# Patient Record
Sex: FEMALE | Race: OTHER | Employment: FULL TIME | URBAN - METROPOLITAN AREA
[De-identification: names, ages, dates, MRNs, and addresses within clinical notes are randomized per-mention and may not be internally consistent; named-entity substitution may affect disease eponyms.]

---

## 2020-04-22 ENCOUNTER — HOSPITAL ENCOUNTER (OUTPATIENT)
Dept: PHYSICAL THERAPY | Age: 35
Setting detail: THERAPIES SERIES
Discharge: HOME | End: 2020-04-22
Payer: COMMERCIAL

## 2020-04-22 PROCEDURE — 97110 THERAPEUTIC EXERCISES: CPT | Performed by: PHYSICAL THERAPIST

## 2020-04-22 PROCEDURE — 97124 MASSAGE THERAPY: CPT | Performed by: PHYSICAL THERAPIST

## 2020-04-22 PROCEDURE — 97161 PT EVAL LOW COMPLEX 20 MIN: CPT | Performed by: PHYSICAL THERAPIST

## 2020-04-23 ASSESSMENT — PAIN DESCRIPTION - ONSET: ONSET: ON-GOING

## 2020-04-23 ASSESSMENT — PAIN SCALES - GENERAL: PAINLEVEL_OUTOF10: 3

## 2020-04-23 ASSESSMENT — PAIN DESCRIPTION - ORIENTATION: ORIENTATION: LOWER

## 2020-04-23 ASSESSMENT — PAIN DESCRIPTION - FREQUENCY: FREQUENCY: CONTINUOUS

## 2020-04-23 ASSESSMENT — PAIN DESCRIPTION - PROGRESSION: CLINICAL_PROGRESSION: NOT CHANGED

## 2020-04-23 ASSESSMENT — PAIN - FUNCTIONAL ASSESSMENT: PAIN_FUNCTIONAL_ASSESSMENT: PREVENTS OR INTERFERES SOME ACTIVE ACTIVITIES AND ADLS

## 2020-04-23 ASSESSMENT — PAIN DESCRIPTION - PAIN TYPE: TYPE: ACUTE PAIN

## 2020-04-23 ASSESSMENT — PAIN DESCRIPTION - DESCRIPTORS: DESCRIPTORS: ACHING;SHARP

## 2020-04-23 ASSESSMENT — PAIN DESCRIPTION - LOCATION: LOCATION: BACK

## 2020-04-23 NOTE — PROGRESS NOTES
[facility]  PHYSICAL THERAPY EVALUATION    Date: 2020  Patient Name: Berny Walden       MRN: [id number]   Account: [de-identified]   : 1985  (38 y.o.)   Gender: female   Referring Practitioner: Martha Mahan M.D. Diagnosis: Sprain of ligaments of the lumbar spine  Treatment Diagnosis: Sprain of the lumbar area           Other position/activity restrictions: Patient is currently on light duty with lifting restrictions 5# or less, push/pull 20# or less    Past Medical History:  has no past medical history on file. Past Surgical History:   has no past surgical history on file. Vital Signs  Patient Currently in Pain: Yes   Pain Screening  Patient Currently in Pain: Yes  Pain Assessment  Pain Assessment: 0-10  Pain Level: 3  Patient's Stated Pain Goal: No pain  Pain Type: Acute pain  Pain Location: Back  Pain Orientation: Lower  Pain Descriptors: Aching; Sharp  Pain Frequency: Continuous  Pain Onset: On-going  Clinical Progression: Not changed  Functional Pain Assessment: Prevents or interferes some active activities and ADLs  Non-Pharmaceutical Pain Intervention(s): (Lidocane patch)  Multiple Pain Sites: No           Active : Yes  Mode of Transportation: Car  Occupation: Full time employment  Type of occupation: Head lead at [employer]        Subjective:  Subjective: Patient states that while at work, she was picking up buckets of product and lifting it overhead. She then started getting pain of her lower back and felt pinching. The next day was the weekend and she could not get out of bed because of her lower back pain. On Monday she returned to work but had to leave early because of her lower back pain. She went the ER in [place] and was given medication and sent home for two days. She was unable to work, then was seen in [address] [place] [address]. She was given Meloxicam and ordered physical therapy.  She reports she is on light duty mostly sitting doing paper work. She is having difficulty sleeping. Objective:      Strength RLE  Strength RLE: WNL  Strength LLE  Strength LLE: WNL         AROM RLE (degrees)  RLE AROM: WNL     AROM LLE (degrees)  LLE AROM : WNL        AROM RUE (degrees)  RUE AROM : WNL  AROM LUE (degrees)  LUE AROM : WNL    Spine  Lumbar: Forward flexion 0-30; extension 0-20; right rotation 0-20; left rotation 0-35, right and left sidebend WNL    Observation/Palpation  Posture: Good  Palpation: Moderate soft tissue tightness bilateral lumbar paraspinals   Observation: Increased lumbar lordosis  Body Mechanics: Fair        Additional Measures  Special Tests: Positive SI compression test, DTR patella and achilles normal bilaterally; negative SLR; negative Jillian         Exercises:   Exercises  Exercise 1: Supine knee rolls  Exercise 2: Standing trunk rotation  Exercise 3: Standing trunk extension  Exercise 4: Standing trunk sidebending  Exercise 5: Sitting forward flexion  Modalities: Massage to mid lumbar paraspinals     Manual:     *Indicates exercise,modality, or manual techniques to be initiated when appropriate  Assessment: Body structures, Functions, Activity limitations: Decreased functional mobility , Decreased ROM, Decreased strength, Increased pain(Unable to tolerate regular work duty)  Assessment: Problem List:  1. Decreased trunk range of motion 2. Soft tissue tightness bilateral lumbar paraspinals, 3. Fair body mechanics in relation to work duty 4. Unable to tolerate regular work duty 5. Pain of the lumbar area  Specific instructions for Next Treatment: Do not wear lidocane patch prior to therapy  Prognosis: Excellent  Discharge Recommendations: 2-3 sessions per week  Activity Tolerance: Patient limited by pain  Activity Tolerance: Patient had difficulty with supine knee to chest due to pain.   She was wearing a Lidocane patch and it was recommended she not use one prior to therapy sessions

## 2020-04-28 ENCOUNTER — HOSPITAL ENCOUNTER (OUTPATIENT)
Dept: PHYSICAL THERAPY | Age: 35
Setting detail: THERAPIES SERIES
Discharge: HOME | End: 2020-04-28
Payer: COMMERCIAL

## 2020-04-28 PROCEDURE — G0283 ELEC STIM OTHER THAN WOUND: HCPCS | Performed by: PHYSICAL THERAPIST

## 2020-04-28 PROCEDURE — 97110 THERAPEUTIC EXERCISES: CPT | Performed by: PHYSICAL THERAPIST

## 2020-04-28 ASSESSMENT — PAIN DESCRIPTION - LOCATION: LOCATION: BACK

## 2020-04-28 ASSESSMENT — PAIN DESCRIPTION - PROGRESSION: CLINICAL_PROGRESSION: GRADUALLY IMPROVING

## 2020-04-28 ASSESSMENT — PAIN SCALES - GENERAL: PAINLEVEL_OUTOF10: 1

## 2020-04-28 NOTE — PROGRESS NOTES
residing in the right SI area. Prognosis: Excellent  PT Education: Home Exercise Program, Plan of Care  Patient Education: HEP instruction sheet with pictures given to patient    Goals:          Progress toward goals:    POST-PAIN       Pain Rating (0-10 pain scale):  1 /10   Location and pain description same as pre-treatment unless indicated. 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 See objective section for any therapeutic exercise changes, additions or modifications this date.          PT Individual Minutes  Time In: 1100  Time Out: 5364  Minutes: 45  Timed Code Treatment Minutes: 45 Minutes  Procedure Minutes:     Timed Activity Minutes Units   Ther Ex 30 2   Manual  IES 15 1       Signature:  Electronically signed by Amanda Cordon PT on 4/28/20 at 11:55 AM EDT

## 2020-04-30 ENCOUNTER — HOSPITAL ENCOUNTER (OUTPATIENT)
Dept: PHYSICAL THERAPY | Age: 35
Setting detail: THERAPIES SERIES
Discharge: HOME | End: 2020-04-30
Payer: COMMERCIAL

## 2020-04-30 PROCEDURE — G0283 ELEC STIM OTHER THAN WOUND: HCPCS

## 2020-04-30 PROCEDURE — 97110 THERAPEUTIC EXERCISES: CPT

## 2020-04-30 PROCEDURE — 97124 MASSAGE THERAPY: CPT

## 2020-04-30 ASSESSMENT — PAIN DESCRIPTION - ONSET: ONSET: ON-GOING

## 2020-04-30 ASSESSMENT — PAIN DESCRIPTION - LOCATION: LOCATION: BACK

## 2020-04-30 ASSESSMENT — PAIN DESCRIPTION - FREQUENCY: FREQUENCY: CONTINUOUS

## 2020-04-30 ASSESSMENT — PAIN DESCRIPTION - DESCRIPTORS: DESCRIPTORS: SHOOTING;TIGHTNESS

## 2020-04-30 ASSESSMENT — PAIN SCALES - GENERAL: PAINLEVEL_OUTOF10: 2

## 2020-04-30 ASSESSMENT — PAIN DESCRIPTION - PAIN TYPE: TYPE: ACUTE PAIN

## 2020-04-30 NOTE — PROGRESS NOTES
posterior pelvic tilts x10  Strength: [x] NT  [] MMT completed:   ROM: [x] NT  [] ROM measurements:   Manual:   Manual therapy  Soft Tissue Mobalization: Myofascial release progressed to deep tissue massage lower thoracic to low lumbar/SI region bilateral  Modalities:  Modalities  Moist heat: to low lumbar/SI region start of session  Cryotherapy (Minutes\Location): with IFC after exercises  E-stim (parameters): IFC to low lumbar with ice end of session     Assessment:   Activity Tolerance  Activity Tolerance: Patient limited by pain  Activity Tolerance: Pt tolerated exercises well within pain-free range, but range is very limited  Body structures, Functions, Activity limitations: Decreased ROM, Decreased strength, Increased pain(Unable to tolerate regular work duty)  Assessment: Most exercises were graded to stay within pain-free range to prevent muscle irritation. Pt unable to reach end range with any attempted exercise this session due to pain; need to progress flexibility/ functional movement in greater range. Pt exhibits significant tightness in low lumbar, sacral area. Weakness of quadratus lumborum noted as demonstrated by poor hip hiking ability. Treatment Diagnosis: Sprain of the lumbar area  Prognosis: Excellent  PT Education: Home Exercise Program, Plan of Care  Patient Education: Pt instructed to perform exercises in pain-free range only to prevent muscle irritation. Goals:  Short term goals  Short term goal 1: Increase trunk range of motion by 10-20 all affected motions  Short term goal 2: Pain of lumbar area decreased to 1/10  Short term goal 3: Minimal soft tissue tightness of the lumbar paraspinals  Progress toward goals: Fair    POST-PAIN       Pain Rating (0-10 pain scale):   1/10   Location and pain description same as pre-treatment unless indicated.    Action: [] NA   [x] Perform HEP  [x] Meds as prescribed  [x] Modalities as prescribed   [] Call Physician   Frequency/Duration:  Plan  Times

## 2020-05-05 ENCOUNTER — HOSPITAL ENCOUNTER (OUTPATIENT)
Dept: PHYSICAL THERAPY | Age: 35
Setting detail: THERAPIES SERIES
Discharge: HOME | End: 2020-05-05
Payer: COMMERCIAL

## 2020-05-05 PROCEDURE — 97110 THERAPEUTIC EXERCISES: CPT | Performed by: PHYSICAL THERAPIST

## 2020-05-05 PROCEDURE — G0283 ELEC STIM OTHER THAN WOUND: HCPCS | Performed by: PHYSICAL THERAPIST

## 2020-05-05 PROCEDURE — 97124 MASSAGE THERAPY: CPT | Performed by: PHYSICAL THERAPIST

## 2020-05-05 ASSESSMENT — PAIN DESCRIPTION - FREQUENCY: FREQUENCY: CONTINUOUS

## 2020-05-05 ASSESSMENT — PAIN DESCRIPTION - PROGRESSION: CLINICAL_PROGRESSION: GRADUALLY WORSENING

## 2020-05-05 ASSESSMENT — PAIN SCALES - GENERAL: PAINLEVEL_OUTOF10: 5

## 2020-05-05 ASSESSMENT — PAIN DESCRIPTION - LOCATION: LOCATION: BACK

## 2020-05-05 ASSESSMENT — PAIN DESCRIPTION - PAIN TYPE: TYPE: ACUTE PAIN

## 2020-05-05 ASSESSMENT — PAIN DESCRIPTION - ONSET: ONSET: ON-GOING

## 2020-05-05 ASSESSMENT — PAIN - FUNCTIONAL ASSESSMENT: PAIN_FUNCTIONAL_ASSESSMENT: PREVENTS OR INTERFERES SOME ACTIVE ACTIVITIES AND ADLS

## 2020-05-05 ASSESSMENT — PAIN DESCRIPTION - ORIENTATION: ORIENTATION: RIGHT

## 2020-05-05 ASSESSMENT — PAIN DESCRIPTION - DESCRIPTORS: DESCRIPTORS: ACHING

## 2020-05-05 NOTE — PROGRESS NOTES
area    Modalities:  Modalities  Moist heat: Prior to massage  Cryotherapy (Minutes\Location): with IFC to the low back after exercises  E-stim (parameters): IFC to low back in jeffrey cross pattern with ice to decrease pain     *Indicates exercise, modality, or manual techniques to be initiated when appropriate    Assessment:   Activity Tolerance  Activity Tolerance: Patient limited by pain  Activity Tolerance: Had difficulty due to pain. Consulted with P.A. about changing meds to Motrin and Tylenol, instead of using Naprosyn    Body structures, Functions, Activity limitations: Decreased ROM, Decreased strength, Increased pain  Assessment: Had difficulty with exercise due to pain complaints     Prognosis: Good  PT Education: Plan of Care    Goals:          Progress toward goals:    POST-PAIN       Pain Rating (0-10 pain scale): 5 /10   Location and pain description same as pre-treatment unless indicated. Action: [] NA   [] Perform HEP  [x] Meds as prescribed  [x] Modalities as prescribed   [] Call Physician     Frequency/Duration:  Plan  Current Treatment Recommendations: Strengthening, Modalities, Pain Management, ROM     Pt to continue current HEP. See objective section for any therapeutic exercise changes, additions or modifications this date.          PT Individual Minutes  Time In: 1000  Time Out: 1100  Minutes: 60  Timed Code Treatment Minutes: 50 Minutes  Procedure Minutes:     Timed Activity Minutes Units   Ther Ex 20 1   Manual  Mass 15   IFC 15 1  1       Signature:  Electronically signed by Rylan Rivera PT on 5/5/20 at 10:57 AM EDT

## 2020-05-14 ENCOUNTER — HOSPITAL ENCOUNTER (OUTPATIENT)
Dept: MRI IMAGING | Age: 35
Discharge: HOME | End: 2020-05-16
Payer: COMMERCIAL

## 2020-05-14 ENCOUNTER — HOSPITAL ENCOUNTER (OUTPATIENT)
Dept: PHYSICAL THERAPY | Age: 35
Setting detail: THERAPIES SERIES
Discharge: HOME | End: 2020-05-14
Payer: COMMERCIAL

## 2020-05-14 PROCEDURE — 97110 THERAPEUTIC EXERCISES: CPT | Performed by: PHYSICAL THERAPIST

## 2020-05-14 PROCEDURE — 72148 MRI LUMBAR SPINE W/O DYE: CPT

## 2020-05-14 PROCEDURE — 97035 APP MDLTY 1+ULTRASOUND EA 15: CPT | Performed by: PHYSICAL THERAPIST

## 2020-05-14 ASSESSMENT — PAIN DESCRIPTION - FREQUENCY: FREQUENCY: CONTINUOUS

## 2020-05-14 ASSESSMENT — PAIN DESCRIPTION - LOCATION: LOCATION: BACK

## 2020-05-14 ASSESSMENT — PAIN SCALES - GENERAL: PAINLEVEL_OUTOF10: 3

## 2020-05-14 ASSESSMENT — PAIN DESCRIPTION - ORIENTATION: ORIENTATION: RIGHT

## 2020-05-14 ASSESSMENT — PAIN DESCRIPTION - ONSET: ONSET: ON-GOING

## 2020-05-14 ASSESSMENT — PAIN DESCRIPTION - PROGRESSION: CLINICAL_PROGRESSION: NOT CHANGED

## 2020-05-14 ASSESSMENT — PAIN - FUNCTIONAL ASSESSMENT: PAIN_FUNCTIONAL_ASSESSMENT: PREVENTS OR INTERFERES SOME ACTIVE ACTIVITIES AND ADLS

## 2020-05-14 ASSESSMENT — PAIN DESCRIPTION - DESCRIPTORS: DESCRIPTORS: ACHING;JABBING

## 2020-05-14 NOTE — PROGRESS NOTES
[id number]   Treatment Note                                          Date: 2020  Patient: Wesley Belcher  : 1985  ACCT #: [de-identified]          Visit Information:  PT Visit Information  Progress Note Counter:     Subjective: Patient reports that she was approved for an MRII and is having it done today.      HEP Compliance:  [x] Good [] Fair [] Poor [] Reports not doing due to:    Vital Signs  Patient Currently in Pain: Yes   Pain Screening  Patient Currently in Pain: Yes  Pain Assessment  Pain Assessment: 0-10  Pain Level: 3  Patient's Stated Pain Goal: No pain  Pain Type: (Subacute)  Pain Location: Back  Pain Orientation: Right  Pain Descriptors: Aching;Jabbing  Pain Frequency: Continuous  Pain Onset: On-going  Clinical Progression: Not changed  Functional Pain Assessment: Prevents or interferes some active activities and ADLs  Multiple Pain Sites: No    OBJECTIVE:   Exercises  Exercise 1: Supine knee rolls 10 reps (pillow under low back/glutes)  Exercise 2: Supine single knee to chest holds (painful this date) on both sides  Exercise 3: Prone knee flexion each leg graded to keep pain reduced x 5  Exercise 4: Prone gluteal sets 10 reps  Exercise 5: Sitting forward flexion holds with forearms on thighs x 15  Exercise 6: Standing trunk active range of motion all planes  Exercise 7: Seated hamstring stretch with opposite leg off plinth  Exercise 8: Rocking quadruped graded stretch of low back (Sit-back toward child's pose)  Exercise 9: Quadruped graded back arches (\"angry cat\") abdominal contraction coming down   Exercise 10: hooklying graded posterior pelvic tilts x10          Strength: [x] NT  [] MMT completed:      ROM: [x] NT  [] ROM measurements:          Manual:        Modalities:  Modalities  Ultrasound: 1.0 MHTZ at 1.5 w/cm2 to the right SI area with 1.0 cc of dexmethasone for 6 minutes     *Indicates exercise, modality, or manual techniques to be initiated when appropriate    Assessment:   Activity Tolerance  Activity Tolerance: Patient Tolerated treatment well, Patient limited by pain    Body structures, Functions, Activity limitations: Decreased ROM, Decreased strength, Increased pain  Assessment: MRI will assist is detrmining next course of treatment     Prognosis: Excellent  PT Education: Plan of Care, Home Exercise Program, Injury Prevention    Goals:          Progress toward goals:    POST-PAIN       Pain Rating (0-10 pain scale): 2  /10   Location and pain description same as pre-treatment unless indicated. Action: [] NA   [x] Perform HEP  [x] Meds as prescribed  [] Modalities as prescribed   [] Call Physician     Frequency/Duration:  Plan  Times per week: 2  Current Treatment Recommendations: Strengthening, Modalities, Pain Management(Work activity)     Pt to continue current HEP. See objective section for any therapeutic exercise changes, additions or modifications this date.          PT Individual Minutes  Time In: 5109  Time Out: 1224  Minutes: 35  Timed Code Treatment Minutes: 35 Minutes  Procedure Minutes:     Timed Activity Minutes Units   Ther Ex 29 2   Manual  Us 8m min 1       Signature:  Electronically signed by Aaron Freire PT on 5/14/20 at 1:53 PM EDT

## 2020-05-20 ENCOUNTER — HOSPITAL ENCOUNTER (OUTPATIENT)
Dept: PHYSICAL THERAPY | Age: 35
Setting detail: THERAPIES SERIES
Discharge: HOME | End: 2020-05-20
Payer: COMMERCIAL

## 2020-05-20 PROCEDURE — 97033 APP MDLTY 1+IONTPHRSIS EA 15: CPT

## 2020-05-20 PROCEDURE — 97110 THERAPEUTIC EXERCISES: CPT

## 2020-05-20 ASSESSMENT — PAIN DESCRIPTION - LOCATION: LOCATION: BACK

## 2020-05-20 ASSESSMENT — PAIN DESCRIPTION - DESCRIPTORS: DESCRIPTORS: CONSTANT;ACHING;SPASM

## 2020-05-20 ASSESSMENT — PAIN DESCRIPTION - ORIENTATION: ORIENTATION: RIGHT

## 2020-05-20 ASSESSMENT — PAIN DESCRIPTION - FREQUENCY: FREQUENCY: CONTINUOUS

## 2020-05-20 NOTE — PROGRESS NOTES
LBP    Body structures, Functions, Activity limitations: Decreased ROM, Decreased strength, Increased pain  Assessment: MRI shows early signs of DJD. Tolerated new exercises well. Pt did not report a decrease in pain after treatment. Treatment Diagnosis: Sprain of the lumbar area          Goals:  Short term goals  Time Frame for Short term goals: 3-6  Short term goal 1: Increase trunk range of motion by 10-20 all affected motions  Short term goal 2: Pain of lumbar area decreased to 1/10  Short term goal 3: Minimal soft tissue tightness of the lumbar paraspinals    Long term goals  Time Frame for Long term goals : 6-12  Long term goal 1: Pain of the lumbar area 0/10  Long term goal 2: Trunk range of motion WNL  Long term goal 3: No further soft tissue tightness noted of lumbar paraspinals  Progress toward goals:    POST-PAIN       Pain Rating (0-10 pain scale): 4  /10   Location and pain description same as pre-treatment unless indicated. Action: [] NA   [] Perform HEP  [] Meds as prescribed  [] Modalities as prescribed   [] Call Physician     Frequency/Duration:  Plan  Times per week: 2  Specific instructions for Next Treatment: Pt. to return to taking Meloxicam as perscribed by MD  Current Treatment Recommendations: Strengthening, Modalities, Pain Management  Plan Comment: Review new exercises and progress to prone leg and arm lifts if gomez. (place pillow under abdomen)     Pt to continue current HEP. See objective section for any therapeutic exercise changes, additions or modifications this date.          PT Individual Minutes  Time In: 4178  Time Out: 6535  Minutes: 55  Timed Code Treatment Minutes: 45 Minutes  Procedure Minutes:6     Timed Activity Minutes Units   Ther Ex 30 2   Ultrasound-ST mob 15 1       Signature:  Electronically signed by Shikha Murcia on 5/20/20 at 10:01 AM EDT

## 2020-05-22 ENCOUNTER — HOSPITAL ENCOUNTER (OUTPATIENT)
Dept: PHYSICAL THERAPY | Age: 35
Setting detail: THERAPIES SERIES
Discharge: HOME | End: 2020-05-22
Payer: COMMERCIAL

## 2020-05-22 NOTE — PROGRESS NOTES
[id number]   Physical Therapy  Cancellation/No Show Note  Patient Name:  Jg Mccray  :  1985   Date:  2020          Visit Information:        For today's appointment patient:  []  Cancelled  []  Rescheduled appointment  [x]  No-show   []  Called pt to remind of next appointment     Reason given by patient:  []  Patient ill  []  Conflicting appointment  []  No transportation    []  Conflict with work  []  No reason given  []  Other:       Comments:       Signature: Electronically signed by Chan Blakely PT on 20 at 10:23 AM EDT

## 2020-05-27 ENCOUNTER — HOSPITAL ENCOUNTER (OUTPATIENT)
Dept: PHYSICAL THERAPY | Age: 35
Setting detail: THERAPIES SERIES
Discharge: HOME | End: 2020-05-27
Payer: COMMERCIAL

## 2020-05-27 PROCEDURE — G0283 ELEC STIM OTHER THAN WOUND: HCPCS | Performed by: PHYSICAL THERAPIST

## 2020-05-27 PROCEDURE — 97110 THERAPEUTIC EXERCISES: CPT | Performed by: PHYSICAL THERAPIST

## 2020-05-27 PROCEDURE — 97035 APP MDLTY 1+ULTRASOUND EA 15: CPT | Performed by: PHYSICAL THERAPIST

## 2020-05-27 ASSESSMENT — PAIN DESCRIPTION - FREQUENCY: FREQUENCY: CONTINUOUS

## 2020-05-27 ASSESSMENT — PAIN DESCRIPTION - ORIENTATION: ORIENTATION: RIGHT

## 2020-05-27 ASSESSMENT — PAIN DESCRIPTION - PROGRESSION: CLINICAL_PROGRESSION: NOT CHANGED

## 2020-05-27 ASSESSMENT — PAIN DESCRIPTION - ONSET: ONSET: ON-GOING

## 2020-05-27 ASSESSMENT — PAIN DESCRIPTION - DESCRIPTORS: DESCRIPTORS: ACHING;SHARP

## 2020-05-27 ASSESSMENT — PAIN SCALES - GENERAL: PAINLEVEL_OUTOF10: 4

## 2020-05-27 ASSESSMENT — PAIN - FUNCTIONAL ASSESSMENT: PAIN_FUNCTIONAL_ASSESSMENT: PREVENTS OR INTERFERES SOME ACTIVE ACTIVITIES AND ADLS

## 2020-05-27 ASSESSMENT — PAIN DESCRIPTION - LOCATION: LOCATION: BACK

## 2020-05-27 NOTE — PROGRESS NOTES
[id number] [address]   Treatment Note                                          Date: 2020  Patient: Robles Wise  : 1985  ACCT #: [de-identified]          Visit Information:  PT Visit Information  Progress Note Counter:     Subjective: Patient reports her right SI area is still painful. She bent over in the shower and had a bad pain of the right side. HEP Compliance:  [x] Good [] Fair [] Poor [] Reports not doing due to:    Vital Signs  Patient Currently in Pain: Yes   Pain Screening  Patient Currently in Pain: Yes  Pain Assessment  Pain Assessment: 0-10  Pain Level: 4  Patient's Stated Pain Goal: No pain  Pain Type: (Subacute)  Pain Location: Back  Pain Orientation: Right  Pain Descriptors: Aching; Sharp  Pain Frequency: Continuous  Pain Onset: On-going  Clinical Progression: Not changed  Functional Pain Assessment: Prevents or interferes some active activities and ADLs  Non-Pharmaceutical Pain Intervention(s): Cold applied  Multiple Pain Sites: No    OBJECTIVE:   Exercises  Exercise 1: Supine lower trunk rotation  Exercise 2: Supine modified piriformis stretch  Exercise 3: Supine posterior pelvic tilts  Exercise 4: Supine hamstring stretch  Exercise 5: Supine bent knee fall outs   Exercise 6: Supine abdominal isometrics  Exercise 7: DLS bilateral arms 2# 10 reps  Exercise 8: DLS march in place  Exercise 9: Standing forward flexion foot on stair        Strength: [x] NT  [] MMT completed:     ROM: [x] NT  [] ROM measurements:   Manual:        Modalities:  Modalities  Ultrasound: 1.0 MHZ at 1.5 w/cm2 with 1.0 cc dexmethasone cream to the right SI area for 8 minutes  E-stim (parameters):  IES with cold pack the low back in jeffrey-cross pattern to decrease pain     *Indicates exercise, modality, or manual techniques to be initiated when appropriate    Assessment:   Activity Tolerance  Activity Tolerance: Patient limited by pain  Activity Tolerance: Patient tolerated the new stretches well, and

## 2020-05-29 ENCOUNTER — HOSPITAL ENCOUNTER (OUTPATIENT)
Dept: PHYSICAL THERAPY | Age: 35
Setting detail: THERAPIES SERIES
Discharge: HOME | End: 2020-05-29
Payer: COMMERCIAL

## 2020-05-29 PROCEDURE — 97035 APP MDLTY 1+ULTRASOUND EA 15: CPT | Performed by: PHYSICAL THERAPIST

## 2020-05-29 PROCEDURE — G0283 ELEC STIM OTHER THAN WOUND: HCPCS | Performed by: PHYSICAL THERAPIST

## 2020-05-29 PROCEDURE — 97110 THERAPEUTIC EXERCISES: CPT | Performed by: PHYSICAL THERAPIST

## 2020-05-29 ASSESSMENT — PAIN DESCRIPTION - ONSET: ONSET: ON-GOING

## 2020-05-29 ASSESSMENT — PAIN SCALES - GENERAL: PAINLEVEL_OUTOF10: 3

## 2020-05-29 ASSESSMENT — PAIN DESCRIPTION - FREQUENCY: FREQUENCY: CONTINUOUS

## 2020-05-29 ASSESSMENT — PAIN DESCRIPTION - DESCRIPTORS: DESCRIPTORS: SHARP

## 2020-05-29 ASSESSMENT — PAIN DESCRIPTION - PROGRESSION: CLINICAL_PROGRESSION: GRADUALLY IMPROVING

## 2020-05-29 ASSESSMENT — PAIN DESCRIPTION - ORIENTATION: ORIENTATION: RIGHT

## 2020-05-29 ASSESSMENT — PAIN DESCRIPTION - LOCATION: LOCATION: BACK

## 2020-05-29 ASSESSMENT — PAIN - FUNCTIONAL ASSESSMENT: PAIN_FUNCTIONAL_ASSESSMENT: PREVENTS OR INTERFERES SOME ACTIVE ACTIVITIES AND ADLS

## 2020-05-29 NOTE — PROGRESS NOTES
reps    Body structures, Functions, Activity limitations: Decreased ROM, Decreased strength, Increased pain(Unable to tolerate regular work duty)  Assessment: Right SI joint less painful. Prognosis: Excellent  PT Education: Home Exercise Program    Goals:          Progress toward goals:    POST-PAIN       Pain Rating (0-10 pain scale):  3 /10   Location and pain description same as pre-treatment unless indicated. Action: [] NA   [x] Perform HEP  [x] Meds as prescribed  [x] Modalities as prescribed   [] Call Physician     Frequency/Duration:  Plan  Current Treatment Recommendations: Strengthening, ROM, Modalities     Pt to continue current HEP. See objective section for any therapeutic exercise changes, additions or modifications this date.          PT Individual Minutes  Time In: 1430  Time Out: 0466  Minutes: 53  Timed Code Treatment Minutes: 53 Minutes  Procedure Minutes:     Timed Activity Minutes Units   Ther Ex 30 2   Manual  Us 8 min  ies  15 1  1       Signature:  Electronically signed by Fiona Gaytan PT on 5/29/20 at 3:55 PM EDT

## 2020-06-01 ENCOUNTER — OFFICE VISIT (OUTPATIENT)
Dept: ORTHOPEDIC SURGERY | Age: 35
End: 2020-06-01
Payer: COMMERCIAL

## 2020-06-01 VITALS
WEIGHT: 164 LBS | TEMPERATURE: 98 F | SYSTOLIC BLOOD PRESSURE: 112 MMHG | BODY MASS INDEX: 27.32 KG/M2 | HEIGHT: 65 IN | OXYGEN SATURATION: 98 % | HEART RATE: 88 BPM | DIASTOLIC BLOOD PRESSURE: 68 MMHG | RESPIRATION RATE: 16 BRPM

## 2020-06-01 PROBLEM — S33.5XXA SPRAIN OF LUMBAR REGION: Status: ACTIVE | Noted: 2020-06-01

## 2020-06-01 PROCEDURE — 99214 OFFICE O/P EST MOD 30 MIN: CPT | Performed by: ORTHOPAEDIC SURGERY

## 2020-06-01 RX ORDER — CYCLOBENZAPRINE HCL 10 MG
TABLET ORAL
COMMUNITY
Start: 2020-05-15

## 2020-06-01 RX ORDER — NAPROXEN 500 MG/1
TABLET ORAL
COMMUNITY
Start: 2020-04-24

## 2020-06-01 NOTE — PROGRESS NOTES
Never Smoker    Smokeless tobacco: Never Used   Substance and Sexual Activity    Alcohol use: Not on file    Drug use: Not on file    Sexual activity: Not on file   Lifestyle    Physical activity     Days per week: Not on file     Minutes per session: Not on file    Stress: Not on file   Relationships    Social connections     Talks on phone: Not on file     Gets together: Not on file     Attends Zoroastrian service: Not on file     Active member of club or organization: Not on file     Attends meetings of clubs or organizations: Not on file     Relationship status: Not on file    Intimate partner violence     Fear of current or ex partner: Not on file     Emotionally abused: Not on file     Physically abused: Not on file     Forced sexual activity: Not on file   Other Topics Concern    Not on file   Social History Narrative    Not on file     No family history on file. No Known Allergies  Current Outpatient Medications on File Prior to Visit   Medication Sig Dispense Refill    cyclobenzaprine (FLEXERIL) 10 MG tablet TAKE 1 TABLET DAILY AT BEDTIME      naproxen (NAPROSYN) 500 MG tablet TAKE 1 TABLET BY MOUTH EVERY 12 HOURS       No current facility-administered medications on file prior to visit. Acute and Chronic Pain Monitoring:   No flowsheet data found. Review of Systems   Constitutional: Negative for chills, fatigue and fever. Musculoskeletal: Negative for arthralgias and joint swelling. Objective--Physical Examination:     /68 (Site: Left Upper Arm, Position: Sitting, Cuff Size: Medium Adult)   Pulse 88   Temp 98 °F (36.7 °C) (Temporal)   Resp 16   Ht 5' 5\" (1.651 m)   Wt 164 lb (74.4 kg)   LMP 05/04/2020 (Approximate)   SpO2 98%   BMI 27.29 kg/m²     Physical Examination:   Ortho Exam  Ms. Izzy Ordaz is a 58-year-old young lady, not apparent distress. She moves around without any apparent discomfort. The lower back has a normal appearance.   There is tenderness over

## 2020-06-03 ENCOUNTER — HOSPITAL ENCOUNTER (OUTPATIENT)
Dept: PHYSICAL THERAPY | Age: 35
Setting detail: THERAPIES SERIES
Discharge: HOME | End: 2020-06-03
Payer: COMMERCIAL

## 2020-06-03 PROCEDURE — 97110 THERAPEUTIC EXERCISES: CPT | Performed by: PHYSICAL THERAPIST

## 2020-06-03 PROCEDURE — G0283 ELEC STIM OTHER THAN WOUND: HCPCS | Performed by: PHYSICAL THERAPIST

## 2020-06-03 PROCEDURE — 97035 APP MDLTY 1+ULTRASOUND EA 15: CPT | Performed by: PHYSICAL THERAPIST

## 2020-06-03 ASSESSMENT — PAIN - FUNCTIONAL ASSESSMENT: PAIN_FUNCTIONAL_ASSESSMENT: PREVENTS OR INTERFERES SOME ACTIVE ACTIVITIES AND ADLS

## 2020-06-03 ASSESSMENT — PAIN DESCRIPTION - ORIENTATION: ORIENTATION: RIGHT

## 2020-06-03 ASSESSMENT — PAIN DESCRIPTION - LOCATION: LOCATION: BACK

## 2020-06-03 ASSESSMENT — PAIN SCALES - GENERAL: PAINLEVEL_OUTOF10: 2

## 2020-06-03 ASSESSMENT — PAIN DESCRIPTION - DESCRIPTORS: DESCRIPTORS: ACHING

## 2020-06-03 ASSESSMENT — PAIN DESCRIPTION - PROGRESSION: CLINICAL_PROGRESSION: GRADUALLY IMPROVING

## 2020-06-03 ASSESSMENT — PAIN DESCRIPTION - ONSET: ONSET: ON-GOING

## 2020-06-03 ASSESSMENT — PAIN DESCRIPTION - FREQUENCY: FREQUENCY: CONTINUOUS

## 2020-06-05 ENCOUNTER — HOSPITAL ENCOUNTER (OUTPATIENT)
Dept: PHYSICAL THERAPY | Age: 35
Setting detail: THERAPIES SERIES
Discharge: HOME | End: 2020-06-05
Payer: COMMERCIAL

## 2020-06-05 PROCEDURE — G0283 ELEC STIM OTHER THAN WOUND: HCPCS | Performed by: PHYSICAL THERAPIST

## 2020-06-05 ASSESSMENT — PAIN - FUNCTIONAL ASSESSMENT: PAIN_FUNCTIONAL_ASSESSMENT: PREVENTS OR INTERFERES WITH MANY ACTIVE NOT PASSIVE ACTIVITIES

## 2020-06-05 ASSESSMENT — PAIN DESCRIPTION - PROGRESSION: CLINICAL_PROGRESSION: RAPIDLY WORSENING

## 2020-06-05 ASSESSMENT — PAIN DESCRIPTION - PAIN TYPE: TYPE: ACUTE PAIN

## 2020-06-05 ASSESSMENT — PAIN DESCRIPTION - LOCATION: LOCATION: BACK

## 2020-06-05 ASSESSMENT — PAIN SCALES - GENERAL: PAINLEVEL_OUTOF10: 10

## 2020-06-05 ASSESSMENT — PAIN DESCRIPTION - FREQUENCY: FREQUENCY: CONTINUOUS

## 2020-06-05 ASSESSMENT — PAIN DESCRIPTION - ONSET: ONSET: SUDDEN

## 2020-06-05 NOTE — PROGRESS NOTES
[id number]  Treatment Note                                          Date: 2020  Patient: Dandre Cool  : 1985  ACCT #: [de-identified]          Visit Information:  PT Visit Information  Progress Note Counter:     Subjective: Patient here today complaining of severe pain of the lower lumbar area. Comments: Patient did state that she tolerated the last therapy session well. She thinks the spams and pain are from walking too much at work  HEP Compliance:  [x] Good [] Fair [] Poor [] Reports not doing due to:    Vital Signs  Patient Currently in Pain: Yes   Pain Screening  Patient Currently in Pain: Yes  Pain Assessment  Pain Assessment: 0-10  Pain Level: 10  Patient's Stated Pain Goal: No pain  Pain Type: Acute pain  Pain Location: Back  Pain Descriptors: Spasm;Jabbing; Sharp  Pain Frequency: Continuous  Pain Onset: Sudden  Clinical Progression: Rapidly worsening  Functional Pain Assessment: Prevents or interferes with many active not passive activities  Non-Pharmaceutical Pain Intervention(s): Cold applied  Multiple Pain Sites: No    OBJECTIVE:        Strength: [x] NT  [] MMT completed:        ROM: [x] NT  [] ROM measurements:      Manual:        Modalities:  Modalities  E-stim (parameters): IES with cold pack the low back in jeffrey-cross pattern to decrease pain     *Indicates exercise, modality, or manual techniques to be initiated when appropriate    Assessment:   Activity Tolerance  Activity Tolerance: Patient limited by pain  Activity Tolerance: Patient could not tolerate any exercise today due to extreme pain    Body structures, Functions, Activity limitations: Decreased ROM, Increased pain  Assessment: Patient was progressing well, however today her pain did not allow for any further ex.  Will re-evaluate next treatment     Prognosis: Good  PT Education: Plan of Care    Goals:      Progress toward goals:Good    POST-PAIN       Pain Rating (0-10 pain scale): 9  /10   Location and

## 2020-06-10 ENCOUNTER — HOSPITAL ENCOUNTER (OUTPATIENT)
Dept: PHYSICAL THERAPY | Age: 35
Setting detail: THERAPIES SERIES
Discharge: HOME | End: 2020-06-10
Payer: COMMERCIAL

## 2020-06-10 PROCEDURE — G0283 ELEC STIM OTHER THAN WOUND: HCPCS | Performed by: PHYSICAL THERAPIST

## 2020-06-10 PROCEDURE — 97110 THERAPEUTIC EXERCISES: CPT | Performed by: PHYSICAL THERAPIST

## 2020-06-10 ASSESSMENT — PAIN DESCRIPTION - PROGRESSION: CLINICAL_PROGRESSION: GRADUALLY IMPROVING

## 2020-06-10 ASSESSMENT — PAIN DESCRIPTION - FREQUENCY: FREQUENCY: CONTINUOUS

## 2020-06-10 ASSESSMENT — PAIN DESCRIPTION - DESCRIPTORS: DESCRIPTORS: ACHING;SHARP

## 2020-06-10 ASSESSMENT — PAIN DESCRIPTION - ORIENTATION: ORIENTATION: RIGHT

## 2020-06-10 ASSESSMENT — PAIN - FUNCTIONAL ASSESSMENT: PAIN_FUNCTIONAL_ASSESSMENT: PREVENTS OR INTERFERES SOME ACTIVE ACTIVITIES AND ADLS

## 2020-06-10 ASSESSMENT — PAIN DESCRIPTION - ONSET: ONSET: ON-GOING

## 2020-06-10 ASSESSMENT — PAIN DESCRIPTION - LOCATION: LOCATION: BACK

## 2020-06-10 ASSESSMENT — PAIN SCALES - GENERAL: PAINLEVEL_OUTOF10: 3

## 2020-06-12 ENCOUNTER — HOSPITAL ENCOUNTER (OUTPATIENT)
Dept: PHYSICAL THERAPY | Age: 35
Setting detail: THERAPIES SERIES
Discharge: HOME | End: 2020-06-12
Payer: COMMERCIAL

## 2020-06-12 PROCEDURE — 97110 THERAPEUTIC EXERCISES: CPT | Performed by: PHYSICAL THERAPIST

## 2020-06-12 PROCEDURE — G0283 ELEC STIM OTHER THAN WOUND: HCPCS | Performed by: PHYSICAL THERAPIST

## 2020-06-12 ASSESSMENT — PAIN DESCRIPTION - ONSET: ONSET: ON-GOING

## 2020-06-12 ASSESSMENT — PAIN DESCRIPTION - PROGRESSION: CLINICAL_PROGRESSION: GRADUALLY IMPROVING

## 2020-06-12 ASSESSMENT — PAIN DESCRIPTION - FREQUENCY: FREQUENCY: CONTINUOUS

## 2020-06-12 ASSESSMENT — PAIN DESCRIPTION - DESCRIPTORS: DESCRIPTORS: ACHING

## 2020-06-12 ASSESSMENT — PAIN - FUNCTIONAL ASSESSMENT: PAIN_FUNCTIONAL_ASSESSMENT: PREVENTS OR INTERFERES SOME ACTIVE ACTIVITIES AND ADLS

## 2020-06-12 ASSESSMENT — PAIN DESCRIPTION - PAIN TYPE: TYPE: CHRONIC PAIN

## 2020-06-12 ASSESSMENT — PAIN SCALES - GENERAL: PAINLEVEL_OUTOF10: 2

## 2020-06-12 ASSESSMENT — PAIN DESCRIPTION - LOCATION: LOCATION: BACK

## 2020-06-15 ENCOUNTER — HOSPITAL ENCOUNTER (OUTPATIENT)
Dept: NUCLEAR MEDICINE | Age: 35
Discharge: HOME | End: 2020-06-17
Payer: COMMERCIAL

## 2020-06-15 PROCEDURE — 78315 BONE IMAGING 3 PHASE: CPT

## 2020-06-15 PROCEDURE — 3430000000 HC RX DIAGNOSTIC RADIOPHARMACEUTICAL: Performed by: PREVENTIVE MEDICINE

## 2020-06-15 PROCEDURE — A9503 TC99M MEDRONATE: HCPCS | Performed by: PREVENTIVE MEDICINE

## 2020-06-15 RX ORDER — TC 99M MEDRONATE 20 MG/10ML
25 INJECTION, POWDER, LYOPHILIZED, FOR SOLUTION INTRAVENOUS
Status: COMPLETED | OUTPATIENT
Start: 2020-06-15 | End: 2020-06-15

## 2020-06-15 RX ADMIN — TC 99M MEDRONATE 29.7 MILLICURIE: 20 INJECTION, POWDER, LYOPHILIZED, FOR SOLUTION INTRAVENOUS at 07:45

## 2020-06-17 ENCOUNTER — HOSPITAL ENCOUNTER (OUTPATIENT)
Dept: PHYSICAL THERAPY | Age: 35
Setting detail: THERAPIES SERIES
Discharge: HOME | End: 2020-06-17
Payer: COMMERCIAL

## 2020-06-17 PROCEDURE — 97110 THERAPEUTIC EXERCISES: CPT | Performed by: PHYSICAL THERAPIST

## 2020-06-17 PROCEDURE — G0283 ELEC STIM OTHER THAN WOUND: HCPCS | Performed by: PHYSICAL THERAPIST

## 2020-06-17 ASSESSMENT — PAIN - FUNCTIONAL ASSESSMENT: PAIN_FUNCTIONAL_ASSESSMENT: PREVENTS OR INTERFERES SOME ACTIVE ACTIVITIES AND ADLS

## 2020-06-17 ASSESSMENT — PAIN DESCRIPTION - FREQUENCY: FREQUENCY: CONTINUOUS

## 2020-06-17 ASSESSMENT — PAIN DESCRIPTION - PAIN TYPE: TYPE: CHRONIC PAIN

## 2020-06-17 ASSESSMENT — PAIN DESCRIPTION - DESCRIPTORS: DESCRIPTORS: ACHING;SPASM

## 2020-06-17 ASSESSMENT — PAIN DESCRIPTION - LOCATION: LOCATION: BACK

## 2020-06-17 ASSESSMENT — PAIN SCALES - GENERAL: PAINLEVEL_OUTOF10: 3

## 2020-06-17 ASSESSMENT — PAIN DESCRIPTION - ONSET: ONSET: ON-GOING

## 2020-06-17 ASSESSMENT — PAIN DESCRIPTION - PROGRESSION: CLINICAL_PROGRESSION: GRADUALLY IMPROVING

## 2020-06-17 NOTE — PROGRESS NOTES
jeffrey-cross pattern to decrease pain     *Indicates exercise, modality, or manual techniques to be initiated when appropriate    Assessment:   Activity Tolerance  Activity Tolerance: Patient Tolerated treatment well  Activity Tolerance: Able to tolerate dynamic lumbar stabilization exercises without any complaints, able to tolerate further ex    Body structures, Functions, Activity limitations: Increased pain(Unable to tolerate regular work duty)  Assessment: Bone scan is normal.  Will work on strengthening her abdominals and back extensors. Will consider a back support for comfort during work        PT Education: Plan of Care, Home Exercise Program    Goals:          Progress toward goals:Good    POST-PAIN       Pain Rating (0-10 pain scale):   2/10   Location and pain description same as pre-treatment unless indicated. Action: [] NA   [x] Perform HEP  [x] Meds as prescribed  [x] Modalities as prescribed   [] Call Physician     Frequency/Duration:  Plan  Times per week: 2  Current Treatment Recommendations: Strengthening, Modalities, Pain Management     Pt to continue current HEP. See objective section for any therapeutic exercise changes, additions or modifications this date.          PT Individual Minutes  Time In: 1400  Time Out: 1500  Minutes: 60  Timed Code Treatment Minutes: 50 Minutes  Procedure Minutes:     Timed Activity Minutes code In/out Units   Ther Ex 45 [id number] 3   Manual  IES 15 [id number] 1       Signature:  Electronically signed by Basilio Hancock PT on 6/17/20 at 4:01 PM EDT

## 2020-06-19 ENCOUNTER — HOSPITAL ENCOUNTER (OUTPATIENT)
Dept: PHYSICAL THERAPY | Age: 35
Setting detail: THERAPIES SERIES
Discharge: HOME | End: 2020-06-19
Payer: COMMERCIAL

## 2020-06-19 PROCEDURE — 97110 THERAPEUTIC EXERCISES: CPT | Performed by: PHYSICAL THERAPIST

## 2020-06-19 PROCEDURE — G0283 ELEC STIM OTHER THAN WOUND: HCPCS | Performed by: PHYSICAL THERAPIST

## 2020-06-19 ASSESSMENT — PAIN - FUNCTIONAL ASSESSMENT: PAIN_FUNCTIONAL_ASSESSMENT: PREVENTS OR INTERFERES SOME ACTIVE ACTIVITIES AND ADLS

## 2020-06-19 ASSESSMENT — PAIN DESCRIPTION - DESCRIPTORS: DESCRIPTORS: ACHING

## 2020-06-19 ASSESSMENT — PAIN DESCRIPTION - ONSET: ONSET: ON-GOING

## 2020-06-19 ASSESSMENT — PAIN DESCRIPTION - LOCATION: LOCATION: BACK

## 2020-06-19 ASSESSMENT — PAIN DESCRIPTION - PROGRESSION: CLINICAL_PROGRESSION: GRADUALLY IMPROVING

## 2020-06-19 ASSESSMENT — PAIN DESCRIPTION - PAIN TYPE: TYPE: CHRONIC PAIN

## 2020-06-19 ASSESSMENT — PAIN SCALES - GENERAL: PAINLEVEL_OUTOF10: 2

## 2020-06-19 ASSESSMENT — PAIN DESCRIPTION - FREQUENCY: FREQUENCY: CONTINUOUS

## 2020-06-24 ENCOUNTER — HOSPITAL ENCOUNTER (OUTPATIENT)
Dept: PHYSICAL THERAPY | Age: 35
Setting detail: THERAPIES SERIES
Discharge: HOME | End: 2020-06-24
Payer: COMMERCIAL

## 2020-06-24 PROCEDURE — G0283 ELEC STIM OTHER THAN WOUND: HCPCS | Performed by: PHYSICAL THERAPIST

## 2020-06-24 PROCEDURE — 97110 THERAPEUTIC EXERCISES: CPT | Performed by: PHYSICAL THERAPIST

## 2020-06-24 ASSESSMENT — PAIN - FUNCTIONAL ASSESSMENT: PAIN_FUNCTIONAL_ASSESSMENT: PREVENTS OR INTERFERES SOME ACTIVE ACTIVITIES AND ADLS

## 2020-06-24 ASSESSMENT — PAIN DESCRIPTION - ORIENTATION: ORIENTATION: RIGHT;LEFT

## 2020-06-24 ASSESSMENT — PAIN SCALES - GENERAL: PAINLEVEL_OUTOF10: 8

## 2020-06-24 ASSESSMENT — PAIN DESCRIPTION - PROGRESSION: CLINICAL_PROGRESSION: GRADUALLY WORSENING

## 2020-06-24 ASSESSMENT — PAIN DESCRIPTION - PAIN TYPE: TYPE: CHRONIC PAIN

## 2020-06-24 ASSESSMENT — PAIN DESCRIPTION - FREQUENCY: FREQUENCY: CONTINUOUS

## 2020-06-24 ASSESSMENT — PAIN DESCRIPTION - ONSET: ONSET: PROGRESSIVE

## 2020-06-26 ENCOUNTER — HOSPITAL ENCOUNTER (OUTPATIENT)
Dept: PHYSICAL THERAPY | Age: 35
Setting detail: THERAPIES SERIES
Discharge: HOME | End: 2020-06-26
Payer: COMMERCIAL

## 2020-06-26 PROCEDURE — 97110 THERAPEUTIC EXERCISES: CPT | Performed by: PHYSICAL THERAPIST

## 2020-06-26 PROCEDURE — G0283 ELEC STIM OTHER THAN WOUND: HCPCS | Performed by: PHYSICAL THERAPIST

## 2020-06-26 ASSESSMENT — PAIN DESCRIPTION - ORIENTATION: ORIENTATION: RIGHT;LEFT

## 2020-06-26 ASSESSMENT — PAIN DESCRIPTION - LOCATION: LOCATION: BACK

## 2020-06-26 ASSESSMENT — PAIN DESCRIPTION - FREQUENCY: FREQUENCY: CONTINUOUS

## 2020-06-26 ASSESSMENT — PAIN DESCRIPTION - PAIN TYPE: TYPE: CHRONIC PAIN

## 2020-06-26 ASSESSMENT — PAIN DESCRIPTION - PROGRESSION: CLINICAL_PROGRESSION: GRADUALLY IMPROVING

## 2020-06-26 ASSESSMENT — PAIN DESCRIPTION - ONSET: ONSET: ON-GOING

## 2020-06-26 ASSESSMENT — PAIN SCALES - GENERAL: PAINLEVEL_OUTOF10: 5

## 2020-06-26 ASSESSMENT — PAIN - FUNCTIONAL ASSESSMENT: PAIN_FUNCTIONAL_ASSESSMENT: PREVENTS OR INTERFERES SOME ACTIVE ACTIVITIES AND ADLS

## 2020-07-08 ENCOUNTER — HOSPITAL ENCOUNTER (OUTPATIENT)
Dept: PHYSICAL THERAPY | Age: 35
Setting detail: THERAPIES SERIES
Discharge: HOME | End: 2020-07-08
Payer: COMMERCIAL

## 2020-07-08 PROCEDURE — 97110 THERAPEUTIC EXERCISES: CPT | Performed by: PHYSICAL THERAPIST

## 2020-07-08 PROCEDURE — G0283 ELEC STIM OTHER THAN WOUND: HCPCS | Performed by: PHYSICAL THERAPIST

## 2020-07-08 ASSESSMENT — PAIN DESCRIPTION - PAIN TYPE: TYPE: CHRONIC PAIN

## 2020-07-08 ASSESSMENT — PAIN - FUNCTIONAL ASSESSMENT: PAIN_FUNCTIONAL_ASSESSMENT: PREVENTS OR INTERFERES SOME ACTIVE ACTIVITIES AND ADLS

## 2020-07-08 ASSESSMENT — PAIN DESCRIPTION - ORIENTATION: ORIENTATION: RIGHT;LEFT

## 2020-07-08 ASSESSMENT — PAIN DESCRIPTION - LOCATION: LOCATION: BACK

## 2020-07-08 ASSESSMENT — PAIN SCALES - GENERAL: PAINLEVEL_OUTOF10: 7

## 2020-07-08 ASSESSMENT — PAIN DESCRIPTION - DESCRIPTORS: DESCRIPTORS: SPASM;SHARP

## 2020-07-08 ASSESSMENT — PAIN DESCRIPTION - PROGRESSION: CLINICAL_PROGRESSION: GRADUALLY WORSENING

## 2020-07-08 ASSESSMENT — PAIN DESCRIPTION - FREQUENCY: FREQUENCY: CONTINUOUS

## 2020-07-08 ASSESSMENT — PAIN DESCRIPTION - ONSET: ONSET: ON-GOING

## 2020-07-08 NOTE — PROGRESS NOTES
[id number] [place]   Treatment Note                                          Date: 2020  Patient: Keily Arambula  : 1985  ACCT #: [de-identified]          Visit Information:  PT Visit Information  Progress Note Counter:     Subjective: Patient states she has missed last week's appointment because her daughter had a baby. She is complaining of severe spasms today. Comments: She saw Dr. Rita Shane yesterday and he is wanting to do facet injections. This is pending approval  HEP Compliance:  [] Good [x] Fair [] Poor [] Reports not doing due to:    Vital Signs  Patient Currently in Pain: Yes   Pain Screening  Patient Currently in Pain: Yes  Pain Assessment  Pain Assessment: 0-10  Pain Level: 7  Patient's Stated Pain Goal: No pain  Pain Type: Chronic pain  Pain Location: Back  Pain Orientation: Right;Left  Pain Descriptors: Spasm; Sharp  Pain Frequency: Continuous  Pain Onset: On-going  Clinical Progression: Gradually worsening  Functional Pain Assessment: Prevents or interferes some active activities and ADLs  Non-Pharmaceutical Pain Intervention(s): Cold applied; Heat applied  Multiple Pain Sites: No    OBJECTIVE:   Exercises  Exercise 1: Supine lower trunk rotation  Exercise 2: Supine modified piriformis stretch  Exercise 4: Supine hamstring stretch  Exercise 5: Supine bent knee fall outs   Exercise 9: Standing forward flexion foot on stair       Strength: [x] NT  [] MMT completed:        ROM: [x] NT  [] ROM measurements:            Manual:        Modalities:  Modalities  E-stim (parameters):  IES with moist heat the low back in jeffrey-cross pattern to decrease pain     *Indicates exercise, modality, or manual techniques to be initiated when appropriate    Assessment:   Activity Tolerance  Activity Tolerance: Patient limited by pain  Activity Tolerance: deferred DLS strengthening and just concentrated on stretching exercises    Body structures, Functions, Activity limitations: Decreased strength, Increased pain  Assessment: Her spasms are continuing. When they are not bad she is able to do all of her program.  If the injections are aurthorized this will surely help with her pain and ability to continue with her program.     Prognosis: Fair  PT Education: Home Exercise Program    Goals:          Progress toward goals: Fair due to spasms and pain    POST-PAIN       Pain Rating (0-10 pain scale):   7/10   Location and pain description same as pre-treatment unless indicated. Action: [] NA   [] Perform HEP  [x] Meds as prescribed  [x] Modalities as prescribed   [] Call Physician     Frequency/Duration:  Plan  Times per week: 2  Current Treatment Recommendations: Strengthening, ROM, Home Exercise Program     Pt to continue current HEP. See objective section for any therapeutic exercise changes, additions or modifications this date.          PT Individual Minutes  Time In: 1430  Time Out: 7762  Minutes: 35  Timed Code Treatment Minutes: 35 Minutes  Procedure Minutes:     Timed Activity Minutes code In/out Units   Ther Ex 20 [id number] [id number] 1   IESl  [phone] 1       Signature:  Electronically signed by Miguel Ochoa PT on 7/8/20 at 3:10 PM EDT

## 2020-07-10 ENCOUNTER — HOSPITAL ENCOUNTER (OUTPATIENT)
Dept: PHYSICAL THERAPY | Age: 35
Setting detail: THERAPIES SERIES
Discharge: HOME | End: 2020-07-10
Payer: COMMERCIAL

## 2020-07-10 PROCEDURE — 97110 THERAPEUTIC EXERCISES: CPT | Performed by: PHYSICAL THERAPIST

## 2020-07-10 PROCEDURE — G0283 ELEC STIM OTHER THAN WOUND: HCPCS | Performed by: PHYSICAL THERAPIST

## 2020-07-10 ASSESSMENT — PAIN DESCRIPTION - FREQUENCY: FREQUENCY: CONTINUOUS

## 2020-07-10 ASSESSMENT — PAIN - FUNCTIONAL ASSESSMENT: PAIN_FUNCTIONAL_ASSESSMENT: PREVENTS OR INTERFERES SOME ACTIVE ACTIVITIES AND ADLS

## 2020-07-10 ASSESSMENT — PAIN DESCRIPTION - PROGRESSION: CLINICAL_PROGRESSION: GRADUALLY IMPROVING

## 2020-07-10 ASSESSMENT — PAIN DESCRIPTION - PAIN TYPE: TYPE: CHRONIC PAIN

## 2020-07-10 ASSESSMENT — PAIN DESCRIPTION - DESCRIPTORS: DESCRIPTORS: SPASM;SHARP

## 2020-07-10 ASSESSMENT — PAIN DESCRIPTION - LOCATION: LOCATION: BACK

## 2020-07-10 ASSESSMENT — PAIN DESCRIPTION - ONSET: ONSET: ON-GOING

## 2020-07-10 ASSESSMENT — PAIN DESCRIPTION - ORIENTATION: ORIENTATION: RIGHT;LEFT

## 2020-07-10 ASSESSMENT — PAIN SCALES - GENERAL: PAINLEVEL_OUTOF10: 4

## 2020-07-10 NOTE — PROGRESS NOTES
[id number] [place]   Treatment Note                                          Date: 7/10/2020  Patient: Keily Arambula  : 1985  ACCT #: [de-identified]  Referring Practitioner: Clara Barros M.D. Visit Information:  PT Visit Information  Onset Date: 20  PT Insurance Information: [facility] [employer]  Total # of Visits Approved: 12(second set)  Plan of Care/Certification Expiration Date: 20  Progress Note Counter:           HEP Compliance:  [x] Good [] Fair [] Poor [] Reports not doing due to:    Vital Signs  Patient Currently in Pain: Yes   Pain Screening  Patient Currently in Pain: Yes  Pain Assessment  Pain Assessment: 0-10  Pain Level: 4  Patient's Stated Pain Goal: No pain  Pain Type: Chronic pain  Pain Location: Back  Pain Orientation: Right;Left  Pain Descriptors: Spasm; Sharp  Pain Frequency: Continuous  Pain Onset: On-going  Clinical Progression: Gradually improving  Functional Pain Assessment: Prevents or interferes some active activities and ADLs  Multiple Pain Sites: No    OBJECTIVE:   Exercises  Exercise 1: Supine lower trunk rotation  Exercise 2: Supine modified piriformis stretch  Exercise 4: Supine hamstring stretch  Exercise 5: Supine bent knee fall outs   Exercise 6: Supine abdominal isometrics  Exercise 7: DLS bilateral arms 2# 10 reps  Exercise 8: Roll ball up legs-abdominal strengthening  Exercise 9: Standing forward flexion foot on stair  Exercise 11: DLS 2# supine leg extension 10 reps  Exercise 12: Standing hamstring stretches     Strength: [x] NT  [] MMT completed:     ROM: [x] NT  [] ROM measurements:         Manual:        Modalities:  Modalities  Moist heat: Prior to exercise to the low back  Cryotherapy (Minutes\Location): with IFC to the low back after exercises  E-stim (parameters):  IES with ice the low back in jeffrey-cross pattern to decrease pain     *Indicates exercise, modality, or manual techniques to be initiated when appropriate    Assessment: Activity Tolerance  Activity Tolerance: Patient Tolerated treatment well, Patient limited by pain  Activity Tolerance: Patient is able to tolerate more of the perscribed exercises today, pain of her back has decreased    Body structures, Functions, Activity limitations: Decreased ROM, Decreased strength, Increased pain  Assessment: Patient is reporting she is still having spams of her lower back, but not as bad as this past week. It is more difficult to progress her program with her spasms and pain     Prognosis: Fair  PT Education: Plan of Care, Home Exercise Program    Goals:          Progress toward goals: Fair due to low back pain and spasms    POST-PAIN       Pain Rating (0-10 pain scale):   /10   Location and pain description same as pre-treatment unless indicated. Action: [] NA   [x] Perform HEP  [x] Meds as prescribed  [x] Modalities as prescribed   [] Call Physician     Frequency/Duration:  Plan  Times per week: 2  Current Treatment Recommendations: Modalities, Pain Management, Home Exercise Program, ROM, Strengthening     Pt to continue current HEP. See objective section for any therapeutic exercise changes, additions or modifications this date.          PT Individual Minutes  Time In: 1430  Time Out: 1525  Minutes: 55  Timed Code Treatment Minutes: 45 Minutes  Procedure Minutes:     Timed Activity Minutes code In/out Units   Ther Ex 30 [id number] [phone] 2   Moist heat 10  [id number]    IES 15 [id number] 1       Signature:  Electronically signed by Shyla Phillips PT on 7/10/20 at 3:19 PM EDT

## 2020-07-15 ENCOUNTER — HOSPITAL ENCOUNTER (OUTPATIENT)
Dept: PHYSICAL THERAPY | Age: 35
Setting detail: THERAPIES SERIES
Discharge: HOME | End: 2020-07-15
Payer: COMMERCIAL

## 2020-07-15 PROCEDURE — 97110 THERAPEUTIC EXERCISES: CPT | Performed by: PHYSICAL THERAPIST

## 2020-07-15 ASSESSMENT — PAIN DESCRIPTION - LOCATION: LOCATION: BACK

## 2020-07-15 ASSESSMENT — PAIN DESCRIPTION - ONSET: ONSET: ON-GOING

## 2020-07-15 ASSESSMENT — PAIN SCALES - GENERAL: PAINLEVEL_OUTOF10: 2

## 2020-07-15 ASSESSMENT — PAIN DESCRIPTION - PAIN TYPE: TYPE: CHRONIC PAIN

## 2020-07-15 ASSESSMENT — PAIN DESCRIPTION - DESCRIPTORS: DESCRIPTORS: ACHING

## 2020-07-15 ASSESSMENT — PAIN DESCRIPTION - PROGRESSION: CLINICAL_PROGRESSION: GRADUALLY IMPROVING

## 2020-07-15 ASSESSMENT — PAIN - FUNCTIONAL ASSESSMENT: PAIN_FUNCTIONAL_ASSESSMENT: PREVENTS OR INTERFERES SOME ACTIVE ACTIVITIES AND ADLS

## 2020-07-15 ASSESSMENT — PAIN DESCRIPTION - FREQUENCY: FREQUENCY: CONTINUOUS

## 2020-07-15 ASSESSMENT — PAIN DESCRIPTION - ORIENTATION: ORIENTATION: RIGHT;LEFT

## 2020-07-17 ENCOUNTER — HOSPITAL ENCOUNTER (OUTPATIENT)
Dept: PHYSICAL THERAPY | Age: 35
Setting detail: THERAPIES SERIES
Discharge: HOME | End: 2020-07-17
Payer: COMMERCIAL

## 2020-07-17 NOTE — PROGRESS NOTES
[id number] [place]  Physical Therapy  Cancellation/No Show Note  Patient Name:  Juancarlos Walker  :  1985   Date:  2020          Visit Information:        For today's appointment patient:  [x]  Cancelled  []  Rescheduled appointment  []  No-show   []  Called pt to remind of next appointment     Reason given by patient:  []  Patient ill  []  Conflicting appointment  []  No transportation    []  Conflict with work  []  No reason given  [x]  Other:  Patient had severe spasms yesterday and had to go to the ER,  Occupational Health notified     Comments:       Signature: Electronically signed by Phuong Lauren PT on 20 at 4:20 PM EDT

## 2020-07-22 ENCOUNTER — HOSPITAL ENCOUNTER (OUTPATIENT)
Dept: PHYSICAL THERAPY | Age: 35
Setting detail: THERAPIES SERIES
Discharge: HOME | End: 2020-07-22
Payer: COMMERCIAL

## 2020-07-22 PROCEDURE — 97110 THERAPEUTIC EXERCISES: CPT | Performed by: PHYSICAL THERAPIST

## 2020-07-22 PROCEDURE — G0283 ELEC STIM OTHER THAN WOUND: HCPCS | Performed by: PHYSICAL THERAPIST

## 2020-07-22 ASSESSMENT — PAIN - FUNCTIONAL ASSESSMENT: PAIN_FUNCTIONAL_ASSESSMENT: PREVENTS OR INTERFERES SOME ACTIVE ACTIVITIES AND ADLS

## 2020-07-22 ASSESSMENT — PAIN DESCRIPTION - DESCRIPTORS: DESCRIPTORS: STABBING;SPASM

## 2020-07-22 ASSESSMENT — PAIN DESCRIPTION - LOCATION: LOCATION: BACK

## 2020-07-22 ASSESSMENT — PAIN DESCRIPTION - ORIENTATION: ORIENTATION: RIGHT;LEFT

## 2020-07-22 ASSESSMENT — PAIN SCALES - GENERAL: PAINLEVEL_OUTOF10: 5

## 2020-07-22 ASSESSMENT — PAIN DESCRIPTION - FREQUENCY: FREQUENCY: CONTINUOUS

## 2020-07-22 ASSESSMENT — PAIN DESCRIPTION - ONSET: ONSET: ON-GOING

## 2020-07-22 ASSESSMENT — PAIN DESCRIPTION - PROGRESSION: CLINICAL_PROGRESSION: GRADUALLY WORSENING

## 2020-07-22 ASSESSMENT — PAIN DESCRIPTION - PAIN TYPE: TYPE: CHRONIC PAIN

## 2020-07-22 NOTE — PROGRESS NOTES
her program on hold until further diagnostics or pain injections can be authorized. Progress is being hampered at this time due to her pain and spasms        PT Education: Home Exercise Program  Patient Education: Recommend she continue stretching program at home    Goals:          Progress toward goals:    POST-PAIN       Pain Rating (0-10 pain scale):   5/10   Location and pain description same as pre-treatment unless indicated. Action: [] NA   [x] Perform HEP  [x] Meds as prescribed  [x] Modalities as prescribed   [] Call Physician     Frequency/Duration:  Plan  Plan Comment: Patient's therapy on hold at this time     Pt to continue current HEP. See objective section for any therapeutic exercise changes, additions or modifications this date.          PT Individual Minutes  Time In: 9255  Time Out: 1525  Minutes: 52  Timed Code Treatment Minutes: 42 Minutes  Procedure Minutes:     Timed Activity Minutes code In/out Units   Ther Ex 27 [id number] [phone] 2   IES 15 [id number] [phone] 1       Signature:  Electronically signed by Aracely Hall, PT on 7/22/20 at 4:00 PM EDT

## 2020-07-24 ENCOUNTER — APPOINTMENT (OUTPATIENT)
Dept: PHYSICAL THERAPY | Age: 35
End: 2020-07-24
Payer: COMMERCIAL

## 2020-07-29 ENCOUNTER — APPOINTMENT (OUTPATIENT)
Dept: PHYSICAL THERAPY | Age: 35
End: 2020-07-29
Payer: COMMERCIAL

## 2020-07-31 ENCOUNTER — APPOINTMENT (OUTPATIENT)
Dept: PHYSICAL THERAPY | Age: 35
End: 2020-07-31
Payer: COMMERCIAL

## 2024-10-25 NOTE — PROGRESS NOTES
ROM: [] NT  [] ROM measurements:WNL of trunk except extension      Manual:        Modalities:  Modalities  Ultrasound: 1.0 MHZ at 1.5 w/cm2 with 1.0 cc dexmethasone cream to the right SI area for 8 minutes  E-stim (parameters): IES with cold pack the low back in jeffrey-cross pattern to decrease pain     *Indicates exercise, modality, or manual techniques to be initiated when appropriate    Assessment:   Activity Tolerance  Activity Tolerance: Tolerated more of the [facility] program today. Body structures, Functions, Activity limitations: Decreased strength, Increased pain(Unable to tolerate regular work duty)  Assessment: Progressing well. Her pain is decreasing of her right low back and she is tolerating more abdominal strengthening. Discussed with her about avoiding hyperextending her back. She reports that her work now has a crane to lift the product into the hamper which will help her with lifting over her head and hyperextending her back. Prognosis: Excellent  PT Education: Goals, Plan of Care, Injury Prevention    Goals:  Short term goals  Short term goal 1: Goal met  Short term goal 3: Goal met3    Long term goals  Long term goal 2: Goal met  Progress toward goals:    POST-PAIN       Pain Rating (0-10 pain scale): 1  /10   Location and pain description same as pre-treatment unless indicated. Action: [] NA   [] Perform HEP  [x] Meds as prescribed  [x] Modalities as prescribed   [] Call Physician     Frequency/Duration:  Plan  Current Treatment Recommendations: Home Exercise Program, Pain Management, Strengthening     Pt to continue current HEP. See objective section for any therapeutic exercise changes, additions or modifications this date.          PT Individual Minutes  Time In: 4505  Time Out: [address]  Minutes: 53  Timed Code Treatment Minutes: 53 Minutes  Procedure Minutes:     Timed Activity Minutes code In/out Units   Ther Ex 30 [id number] [id number] 2   Ultraound 8 78961 [phone] 1   IES 15 [id number] [phone]
Controlled with current regime